# Patient Record
Sex: MALE | Race: WHITE | NOT HISPANIC OR LATINO | Employment: OTHER | URBAN - METROPOLITAN AREA
[De-identification: names, ages, dates, MRNs, and addresses within clinical notes are randomized per-mention and may not be internally consistent; named-entity substitution may affect disease eponyms.]

---

## 2018-06-14 ENCOUNTER — CONVERSION ENCOUNTER (OUTPATIENT)
Dept: OTHER | Facility: HOSPITAL | Age: 78
End: 2018-06-14

## 2018-06-14 ENCOUNTER — OFFICE VISIT CONVERTED (OUTPATIENT)
Dept: OTHER | Facility: HOSPITAL | Age: 78
End: 2018-06-14
Attending: NURSE PRACTITIONER

## 2018-07-16 ENCOUNTER — OFFICE VISIT CONVERTED (OUTPATIENT)
Dept: SURGERY | Facility: CLINIC | Age: 78
End: 2018-07-16
Attending: UROLOGY

## 2018-07-16 ENCOUNTER — CONVERSION ENCOUNTER (OUTPATIENT)
Dept: SURGERY | Facility: CLINIC | Age: 78
End: 2018-07-16

## 2018-08-21 ENCOUNTER — OFFICE VISIT CONVERTED (OUTPATIENT)
Dept: SURGERY | Facility: CLINIC | Age: 78
End: 2018-08-21
Attending: SURGERY

## 2018-10-25 ENCOUNTER — OFFICE VISIT CONVERTED (OUTPATIENT)
Dept: SURGERY | Facility: CLINIC | Age: 78
End: 2018-10-25
Attending: SURGERY

## 2018-10-25 ENCOUNTER — CONVERSION ENCOUNTER (OUTPATIENT)
Dept: SURGERY | Facility: CLINIC | Age: 78
End: 2018-10-25

## 2018-10-31 ENCOUNTER — CONVERSION ENCOUNTER (OUTPATIENT)
Dept: OTHER | Facility: HOSPITAL | Age: 78
End: 2018-10-31

## 2018-10-31 ENCOUNTER — OFFICE VISIT CONVERTED (OUTPATIENT)
Dept: OTHER | Facility: HOSPITAL | Age: 78
End: 2018-10-31
Attending: NURSE PRACTITIONER

## 2018-11-27 ENCOUNTER — OFFICE VISIT CONVERTED (OUTPATIENT)
Dept: SURGERY | Facility: CLINIC | Age: 78
End: 2018-11-27
Attending: UROLOGY

## 2019-02-01 ENCOUNTER — OFFICE VISIT CONVERTED (OUTPATIENT)
Dept: SURGERY | Facility: CLINIC | Age: 79
End: 2019-02-01
Attending: UROLOGY

## 2019-02-01 ENCOUNTER — HOSPITAL ENCOUNTER (OUTPATIENT)
Dept: SURGERY | Facility: CLINIC | Age: 79
Discharge: HOME | End: 2019-02-01

## 2019-02-01 ENCOUNTER — CONVERSION ENCOUNTER (OUTPATIENT)
Dept: SURGERY | Facility: CLINIC | Age: 79
End: 2019-02-01

## 2019-02-03 LAB — BACTERIA UR CULT: NORMAL

## 2019-02-22 ENCOUNTER — PROCEDURE VISIT CONVERTED (OUTPATIENT)
Dept: SURGERY | Facility: CLINIC | Age: 79
End: 2019-02-22
Attending: UROLOGY

## 2020-03-17 ENCOUNTER — OFFICE VISIT CONVERTED (OUTPATIENT)
Dept: UROLOGY | Facility: CLINIC | Age: 80
End: 2020-03-17
Attending: UROLOGY

## 2020-05-01 ENCOUNTER — HOSPITAL ENCOUNTER (OUTPATIENT)
Dept: OTHER | Facility: HOSPITAL | Age: 80
Discharge: HOME | End: 2020-05-01
Attending: UROLOGY

## 2020-05-01 LAB
ANION GAP SERPL CALC-SCNC: 16 MMOL/L (ref 8–19)
BUN SERPL-MCNC: 19 MG/DL (ref 5–25)
BUN/CREAT SERPL: 11 {RATIO} (ref 6–20)
CALCIUM SERPL-MCNC: 8.3 MG/DL (ref 8.7–10.4)
CHLORIDE SERPL-SCNC: 105 MMOL/L (ref 99–111)
CONV CO2: 22 MMOL/L (ref 22–32)
CREAT UR-MCNC: 1.71 MG/DL (ref 0.7–1.2)
GFR SERPLBLD BASED ON 1.73 SQ M-ARVRAT: 38 ML/MIN/{1.73_M2}
GLUCOSE SERPL-MCNC: 141 MG/DL (ref 70–99)
OSMOLALITY SERPL CALC.SUM OF ELEC: 293 MOSM/KG (ref 273–304)
POTASSIUM SERPL-SCNC: 3.7 MMOL/L (ref 3.5–5.3)
SODIUM SERPL-SCNC: 139 MMOL/L (ref 135–147)

## 2020-05-15 ENCOUNTER — HOSPITAL ENCOUNTER (OUTPATIENT)
Dept: CT IMAGING | Facility: HOSPITAL | Age: 80
Discharge: HOME | End: 2020-05-15
Attending: UROLOGY

## 2020-05-26 ENCOUNTER — TELEPHONE CONVERTED (OUTPATIENT)
Dept: UROLOGY | Facility: CLINIC | Age: 80
End: 2020-05-26
Attending: UROLOGY

## 2020-10-26 ENCOUNTER — OFFICE VISIT CONVERTED (OUTPATIENT)
Dept: UROLOGY | Facility: CLINIC | Age: 80
End: 2020-10-26
Attending: NURSE PRACTITIONER

## 2020-11-13 ENCOUNTER — OFFICE VISIT CONVERTED (OUTPATIENT)
Dept: UROLOGY | Facility: CLINIC | Age: 80
End: 2020-11-13
Attending: NURSE PRACTITIONER

## 2021-05-13 ENCOUNTER — OFFICE VISIT CONVERTED (OUTPATIENT)
Dept: UROLOGY | Facility: CLINIC | Age: 81
End: 2021-05-13
Attending: NURSE PRACTITIONER

## 2021-05-13 LAB
BILIRUB UR QL STRIP: NEGATIVE
COLOR UR: YELLOW
CONV BACTERIA IN URINE MICRO: 0
CONV CALCIUM OXALATE CRYSTALS /HPF IN URINE SEDIMENT BY MICROSCOPY: 0
CONV CLARITY OF URINE: CLEAR
CONV PROTEIN IN URINE BY AUTOMATED TEST STRIP: NEGATIVE
CONV UROBILINOGEN IN URINE BY AUTOMATED TEST STRIP: NORMAL
GLUCOSE UR QL: NEGATIVE
HGB UR QL STRIP: NEGATIVE
KETONES UR QL STRIP: NEGATIVE
LEUKOCYTE ESTERASE UR QL STRIP: NEGATIVE
NITRITE UR QL STRIP: NEGATIVE
PH UR STRIP.AUTO: 7 [PH]
RBC #/AREA URNS HPF: 0 /[HPF]
RENAL EPI CELLS #/AREA URNS HPF: 0 /[HPF]
SP GR UR: 1.02
SQUAMOUS SPT QL MICRO: 0
WBC #/AREA URNS HPF: 0 /[HPF]

## 2021-05-13 NOTE — PROGRESS NOTES
Progress Note      Patient Name: Mango Bass   Patient ID: [id number]   Sex: Male   YOB: 1940    Primary Care Provider: AIDEE ASHTON   Referring Provider: Xander Cline MD    Visit Date: May 26, 2020    Provider: Xander Cline MD   Location: [facility]   Location Address: [address] [place] [address]  [id number]   Location Phone: [phone]          Chief Complaint  · pt here for urologic issues      History Of Present Illness     80 yo male with h/o microhematuria.     Never had gross hematuria.  No burning or dysuria    Voiding ok. Nocturia X  6.  No urgency or frequency.  No incontinence.  No prostate meds. Pt is bothered.    2020 creatinine 1.7, GFR 38    PVR     3/20   76    Patient is having a lot of back pain and left hip pain.  Pain does seem worse recently.     5/15/2020 CT abdomen/pelvis withoutatrophic left kidney, no obstructive uropathy.  Marketed prostatomegaly.  AAA 3.5 cm.  Moderate compression fracture at L2.     cystoscopynegative   urine culture and cytology negative    Previous    No history of kidney stone.    No urologic family history,  of colon cancer.   Has never had any urologic surgery.    No cardiopulmonary history.  Patient does not smoke.  Patient does not use blood thinner.      UA 25-50rbc/hpf.     former smoker.       Past Medical History  Chronic headaches; Gait instability; Gout; Hyperlipemia; Hypertension; Liver cirrhosis; Lumbar back pain; MI (myocardial infarction); Neuropathy; Neuropathy; Thrombocytopenia; Weight Loss         Past Surgical History  Inguinal Hernia Repair; Open Heart Surgery         Medication List  atenolol 25 mg oral tablet; diazepam 10 mg oral tablet; gabapentin 400 mg oral capsule; Norco 7.5-325 mg oral tablet; pravastatin 20 mg oral tablet; Uloric 40 mg oral tablet; Voltaren 1 % topical gel         Allergy List  Lipitor       Allergies Reconciled  Family Medical History  Congestive Heart  Failure; Colon Cancer; Aneurysm         Social History  Alcohol (Never); ; Recreational Drug Use; Tobacco (Former)         Immunizations  Name Date Admin   Shingles          Review of Systems  · Constitutional  o Denies  o : chills, fever  · Gastrointestinal  o Denies  o : nausea, vomiting      Physical Examination  · Constitutional  o Appearance  o : Well-appearing, well-developed, in no acute distress  · Neurologic  o Mental Status Examination  o :   § Orientation  § : Grossly oriented to person, place and time, judgment and insight intact, normal mood and affect          Assessment  · Microhematuria     599.72/R31.29    Problems Reconciled  Plan  · Medications  o Medications have been Reconciled  o Transition of Care or Provider Policy  · Instructions  o Electronically Identified Patient Education Materials Provided Electronically       CT could not be done with contrast secondary to patient's renal function.  Discussed with patient only way to work-up his ureters and collecting system would be cystoscopy with bilateral retrograde pyelograms.  Risks and benefits of this were discussed.  After discussion patient understands the risk and does not want to proceed with this.    CT was reviewed with the patient he does have a compression fracture and also has AAA.  I will get him in with his primary care doctor and also vascular surgery to follow-up on these things.    Patient understands he must make this follow-up or could be detrimental to his health    Follow-up with our nurse practitioner in 1 year for urinalysis with micro.    If patient has negative UA with micro for 2 years consecutively he can be discharged from clinic.    Greater than 15 minutes was used in counseling and coordination of care, with greater than 51% of this in face-to-face counseling             Electronically Signed by: Xander Cline MD -Author on May 26, 2020 04:49:03 PM
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
## 2021-05-14 VITALS — HEIGHT: 72 IN | WEIGHT: 185.37 LBS | BODY MASS INDEX: 25.11 KG/M2

## 2021-05-14 VITALS — RESPIRATION RATE: 14 BRPM | WEIGHT: 183.12 LBS | BODY MASS INDEX: 24.27 KG/M2 | HEIGHT: 73 IN

## 2021-05-15 VITALS — WEIGHT: 180 LBS | HEIGHT: 72 IN | RESPIRATION RATE: 17 BRPM | BODY MASS INDEX: 24.38 KG/M2

## 2021-05-16 VITALS — WEIGHT: 217 LBS | BODY MASS INDEX: 29.39 KG/M2 | RESPIRATION RATE: 17 BRPM | HEIGHT: 72 IN

## 2021-05-16 VITALS — WEIGHT: 193 LBS | BODY MASS INDEX: 26.14 KG/M2 | RESPIRATION RATE: 12 BRPM | HEIGHT: 72 IN

## 2021-05-16 VITALS — BODY MASS INDEX: 26.14 KG/M2 | HEIGHT: 72 IN | WEIGHT: 193 LBS | RESPIRATION RATE: 12 BRPM

## 2021-05-16 VITALS — HEIGHT: 72 IN | BODY MASS INDEX: 28.04 KG/M2 | WEIGHT: 207 LBS | RESPIRATION RATE: 16 BRPM

## 2021-05-16 VITALS
HEART RATE: 65 BPM | RESPIRATION RATE: 18 BRPM | TEMPERATURE: 98.2 F | SYSTOLIC BLOOD PRESSURE: 138 MMHG | WEIGHT: 193 LBS | OXYGEN SATURATION: 97 % | DIASTOLIC BLOOD PRESSURE: 62 MMHG | BODY MASS INDEX: 26.14 KG/M2 | HEIGHT: 72 IN

## 2021-05-16 VITALS
WEIGHT: 207 LBS | HEIGHT: 72 IN | SYSTOLIC BLOOD PRESSURE: 148 MMHG | DIASTOLIC BLOOD PRESSURE: 72 MMHG | RESPIRATION RATE: 18 BRPM | OXYGEN SATURATION: 97 % | TEMPERATURE: 97.7 F | BODY MASS INDEX: 28.04 KG/M2 | HEART RATE: 76 BPM

## 2021-05-16 VITALS — WEIGHT: 207 LBS | HEIGHT: 72 IN | RESPIRATION RATE: 18 BRPM | BODY MASS INDEX: 28.04 KG/M2
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
## 2021-07-15 VITALS — RESPIRATION RATE: 16 BRPM | BODY MASS INDEX: 25.21 KG/M2 | HEIGHT: 72 IN | WEIGHT: 186.12 LBS

## 2021-07-28 PROBLEM — R26.81 GAIT INSTABILITY: Status: ACTIVE | Noted: 2017-09-07

## 2021-07-28 PROBLEM — R26.81 GAIT INSTABILITY: Status: RESOLVED | Noted: 2017-09-07 | Resolved: 2021-07-28

## 2021-10-28 ENCOUNTER — OFFICE VISIT (OUTPATIENT)
Dept: UROLOGY | Facility: CLINIC | Age: 81
End: 2021-10-28

## 2021-10-28 VITALS — BODY MASS INDEX: 24.6 KG/M2 | WEIGHT: 181.6 LBS | HEIGHT: 72 IN | RESPIRATION RATE: 14 BRPM

## 2021-10-28 DIAGNOSIS — N40.0 BENIGN PROSTATIC HYPERPLASIA, UNSPECIFIED WHETHER LOWER URINARY TRACT SYMPTOMS PRESENT: ICD-10-CM

## 2021-10-28 DIAGNOSIS — R31.0 GROSS HEMATURIA: ICD-10-CM

## 2021-10-28 DIAGNOSIS — N39.0 URINARY TRACT INFECTION WITHOUT HEMATURIA, SITE UNSPECIFIED: Primary | ICD-10-CM

## 2021-10-28 PROBLEM — R09.89 BILATERAL CAROTID BRUITS: Status: ACTIVE | Noted: 2021-10-28

## 2021-10-28 PROBLEM — I25.10 CORONARY ARTERY DISEASE: Status: ACTIVE | Noted: 2021-10-28

## 2021-10-28 PROBLEM — Z95.1 S/P CABG (CORONARY ARTERY BYPASS GRAFT): Status: ACTIVE | Noted: 2021-10-28

## 2021-10-28 PROBLEM — R63.4 WEIGHT LOSS: Status: ACTIVE | Noted: 2021-10-28

## 2021-10-28 PROBLEM — F03.90 DEMENTIA: Status: ACTIVE | Noted: 2021-10-28

## 2021-10-28 PROBLEM — E78.5 HYPERLIPEMIA: Status: ACTIVE | Noted: 2021-10-28

## 2021-10-28 PROBLEM — D69.6 THROMBOCYTOPENIA (HCC): Status: ACTIVE | Noted: 2017-11-30

## 2021-10-28 PROBLEM — R51.9 CHRONIC HEADACHES: Status: ACTIVE | Noted: 2021-10-28

## 2021-10-28 PROBLEM — M10.9 GOUT: Status: ACTIVE | Noted: 2021-10-28

## 2021-10-28 PROBLEM — G62.9 NEUROPATHY: Status: ACTIVE | Noted: 2017-11-30

## 2021-10-28 PROBLEM — G89.29 CHRONIC HEADACHES: Status: ACTIVE | Noted: 2021-10-28

## 2021-10-28 PROBLEM — I10 HYPERTENSION: Status: ACTIVE | Noted: 2021-10-28

## 2021-10-28 PROBLEM — D61.818 PANCYTOPENIA (HCC): Status: ACTIVE | Noted: 2021-07-08

## 2021-10-28 PROBLEM — R07.9 CHEST PAIN: Status: ACTIVE | Noted: 2021-10-28

## 2021-10-28 PROBLEM — R00.2 PALPITATIONS: Status: ACTIVE | Noted: 2021-10-28

## 2021-10-28 PROBLEM — I21.9 MI (MYOCARDIAL INFARCTION): Status: ACTIVE | Noted: 2021-10-28

## 2021-10-28 PROBLEM — I49.3 PVC (PREMATURE VENTRICULAR CONTRACTION): Status: ACTIVE | Noted: 2021-10-28

## 2021-10-28 PROBLEM — M54.50 LUMBAR BACK PAIN: Status: ACTIVE | Noted: 2021-10-28

## 2021-10-28 PROBLEM — K74.60 LIVER CIRRHOSIS (HCC): Status: ACTIVE | Noted: 2021-10-28

## 2021-10-28 PROBLEM — R26.81 UNSTEADY GAIT WHEN WALKING: Status: ACTIVE | Noted: 2017-09-07

## 2021-10-28 LAB
BILIRUB BLD-MCNC: NEGATIVE MG/DL
CLARITY, POC: CLEAR
COLOR UR: YELLOW
EXPIRATION DATE: NORMAL
GLUCOSE UR STRIP-MCNC: NEGATIVE MG/DL
KETONES UR QL: NEGATIVE
LEUKOCYTE EST, POC: NEGATIVE
Lab: NORMAL
NITRITE UR-MCNC: NEGATIVE MG/ML
PH UR: 7.5 [PH] (ref 5–8)
PROT UR STRIP-MCNC: NEGATIVE MG/DL
RBC # UR STRIP: NEGATIVE /UL
SP GR UR: 1.02 (ref 1–1.03)
URINE VOLUME: 116
UROBILINOGEN UR QL: NORMAL

## 2021-10-28 PROCEDURE — 51798 US URINE CAPACITY MEASURE: CPT | Performed by: NURSE PRACTITIONER

## 2021-10-28 PROCEDURE — 81003 URINALYSIS AUTO W/O SCOPE: CPT | Performed by: NURSE PRACTITIONER

## 2021-10-28 PROCEDURE — 99214 OFFICE O/P EST MOD 30 MIN: CPT | Performed by: NURSE PRACTITIONER

## 2021-10-28 RX ORDER — FOLIC ACID 1 MG/1
1 TABLET ORAL DAILY
COMMUNITY
Start: 2021-10-25 | End: 2022-10-26

## 2021-10-28 RX ORDER — FINASTERIDE 5 MG/1
5 TABLET, FILM COATED ORAL DAILY
Qty: 90 TABLET | Refills: 3 | Status: SHIPPED | OUTPATIENT
Start: 2021-10-28

## 2021-10-28 RX ORDER — GABAPENTIN 400 MG/1
CAPSULE ORAL
COMMUNITY
Start: 2021-10-25

## 2021-10-28 RX ORDER — BUPRENORPHINE 5 UG/H
PATCH TRANSDERMAL
COMMUNITY

## 2021-10-28 RX ORDER — FUROSEMIDE 40 MG/1
TABLET ORAL
COMMUNITY

## 2021-10-28 RX ORDER — TAMSULOSIN HYDROCHLORIDE 0.4 MG/1
1 CAPSULE ORAL DAILY
Qty: 30 CAPSULE | Refills: 11 | Status: SHIPPED | OUTPATIENT
Start: 2021-10-28 | End: 2022-10-23

## 2021-10-28 RX ORDER — FINASTERIDE 5 MG/1
TABLET, FILM COATED ORAL
COMMUNITY
Start: 2021-10-25 | End: 2021-10-28 | Stop reason: SDUPTHER

## 2021-10-28 RX ORDER — GABAPENTIN 300 MG/1
CAPSULE ORAL
COMMUNITY

## 2021-10-28 RX ORDER — SPIRONOLACTONE 25 MG/1
TABLET ORAL
COMMUNITY

## 2021-10-28 RX ORDER — ATENOLOL 25 MG/1
TABLET ORAL
COMMUNITY
Start: 2021-10-18

## 2021-10-28 RX ORDER — RIVASTIGMINE TARTRATE 3 MG/1
CAPSULE ORAL
COMMUNITY
Start: 2021-10-25

## 2021-10-28 RX ORDER — QUETIAPINE FUMARATE 50 MG/1
50 TABLET, FILM COATED ORAL
COMMUNITY

## 2021-10-28 RX ORDER — TRAZODONE HYDROCHLORIDE 50 MG/1
TABLET ORAL
COMMUNITY
Start: 2021-09-23

## 2021-10-28 RX ORDER — AMITRIPTYLINE HYDROCHLORIDE 25 MG/1
TABLET, FILM COATED ORAL
COMMUNITY

## 2021-10-28 RX ORDER — HYDROCODONE BITARTRATE AND ACETAMINOPHEN 10; 325 MG/1; MG/1
TABLET ORAL
COMMUNITY
Start: 2021-10-25

## 2021-10-28 RX ORDER — HYDROCHLOROTHIAZIDE 12.5 MG/1
TABLET ORAL
COMMUNITY
Start: 2021-09-16

## 2021-10-28 RX ORDER — AMOXICILLIN AND CLAVULANATE POTASSIUM 875; 125 MG/1; MG/1
TABLET, FILM COATED ORAL
COMMUNITY

## 2021-10-28 RX ORDER — ERGOCALCIFEROL 1.25 MG/1
CAPSULE ORAL
COMMUNITY
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
## 2021-11-12 ENCOUNTER — HOSPITAL ENCOUNTER (OUTPATIENT)
Dept: CT IMAGING | Facility: HOSPITAL | Age: 81
End: 2021-11-12

## 2021-11-23 ENCOUNTER — HOSPITAL ENCOUNTER (OUTPATIENT)
Dept: CT IMAGING | Facility: HOSPITAL | Age: 81
Discharge: HOME | End: 2021-11-23
Admitting: NURSE PRACTITIONER

## 2021-11-23 DIAGNOSIS — R31.0 GROSS HEMATURIA: ICD-10-CM

## 2021-11-23 LAB — CREAT BLDA-MCNC: 1.8 MG/DL

## 2021-11-23 PROCEDURE — 82565 ASSAY OF CREATININE: CPT

## 2021-11-23 PROCEDURE — 0 IOPAMIDOL PER 1 ML: Performed by: NURSE PRACTITIONER

## 2021-11-23 PROCEDURE — 74178 CT ABD&PLV WO CNTR FLWD CNTR: CPT

## 2021-11-23 RX ADMIN — IOPAMIDOL 80 ML: 755 INJECTION, SOLUTION INTRAVENOUS at 11:17

## 2021-12-17 PROBLEM — N40.1 BENIGN PROSTATIC HYPERPLASIA WITH URINARY FREQUENCY: Status: ACTIVE | Noted: 2021-12-17

## 2021-12-17 PROBLEM — R35.0 BENIGN PROSTATIC HYPERPLASIA WITH URINARY FREQUENCY: Status: ACTIVE | Noted: 2021-12-17
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
## 2022-02-14 ENCOUNTER — OFFICE VISIT (OUTPATIENT)
Dept: UROLOGY | Facility: CLINIC | Age: 82
End: 2022-02-14

## 2022-02-14 VITALS — BODY MASS INDEX: 24.52 KG/M2 | RESPIRATION RATE: 16 BRPM | WEIGHT: 181 LBS | HEIGHT: 72 IN

## 2022-02-14 DIAGNOSIS — R31.0 GROSS HEMATURIA: Primary | ICD-10-CM

## 2022-02-14 DIAGNOSIS — N40.1 BENIGN PROSTATIC HYPERPLASIA WITH URINARY FREQUENCY: ICD-10-CM

## 2022-02-14 DIAGNOSIS — R35.0 BENIGN PROSTATIC HYPERPLASIA WITH URINARY FREQUENCY: ICD-10-CM

## 2022-02-14 PROCEDURE — 52000 CYSTOURETHROSCOPY: CPT | Performed by: UROLOGY

## 2022-02-14 PROCEDURE — 99214 OFFICE O/P EST MOD 30 MIN: CPT | Performed by: UROLOGY

## 2022-02-14 RX ORDER — DOXYCYCLINE 100 MG/1
CAPSULE ORAL
COMMUNITY
Start: 2021-12-31

## 2022-04-13 ENCOUNTER — TELEPHONE (OUTPATIENT)
Dept: UROLOGY | Facility: CLINIC | Age: 82
End: 2022-04-13

## 2022-04-13 NOTE — TELEPHONE ENCOUNTER
Tried to call patients phone to remind him to do a PSA prior to his appt Friday 04/15. His phone went straight to voicemail. Called emergency contact who said he will relay the message to him. Order is already in.

## 2022-04-14 ENCOUNTER — TELEPHONE (OUTPATIENT)
Dept: UROLOGY | Facility: CLINIC | Age: 82
End: 2022-04-14

## 2022-04-14 NOTE — TELEPHONE ENCOUNTER
----- Message from Xander Cline MD sent at 4/14/2022 11:29 AM EDT -----  Regarding: Appointment tomorrow  Did he do his PSA, did he get cardiac clearance for possible TURP

## 2022-04-14 NOTE — TELEPHONE ENCOUNTER
Called and spoke with patient, he plans to go to [place] and have his PSA drawn. We are moving his appt out to 4/22/22 @ 1015 due to not having cardiac clearance back and patient not having SPA completed. He voiced understanding

## 2022-04-20 DIAGNOSIS — N40.1 BENIGN PROSTATIC HYPERPLASIA WITH URINARY FREQUENCY: ICD-10-CM

## 2022-04-20 DIAGNOSIS — R31.0 GROSS HEMATURIA: ICD-10-CM

## 2022-04-20 DIAGNOSIS — R35.0 BENIGN PROSTATIC HYPERPLASIA WITH URINARY FREQUENCY: ICD-10-CM
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
## 2022-04-21 ENCOUNTER — TELEPHONE (OUTPATIENT)
Dept: UROLOGY | Facility: CLINIC | Age: 82
End: 2022-04-21

## 2022-04-21 NOTE — TELEPHONE ENCOUNTER
Spoke to Leanna with patients cardiologists requesting clearance. She said she does not see any clearance letters from us in the chart. I got a different fax number and will send this now.

## 2022-04-22 ENCOUNTER — OFFICE VISIT (OUTPATIENT)
Dept: UROLOGY | Facility: CLINIC | Age: 82
End: 2022-04-22

## 2022-04-22 VITALS — RESPIRATION RATE: 20 BRPM | HEIGHT: 72 IN | BODY MASS INDEX: 24.38 KG/M2 | WEIGHT: 180 LBS

## 2022-04-22 DIAGNOSIS — R35.0 BENIGN PROSTATIC HYPERPLASIA WITH URINARY FREQUENCY: Primary | ICD-10-CM

## 2022-04-22 DIAGNOSIS — R31.0 GROSS HEMATURIA: ICD-10-CM

## 2022-04-22 DIAGNOSIS — N40.1 BENIGN PROSTATIC HYPERPLASIA WITH URINARY FREQUENCY: Primary | ICD-10-CM

## 2022-04-22 PROCEDURE — 51798 US URINE CAPACITY MEASURE: CPT | Performed by: UROLOGY

## 2022-04-22 PROCEDURE — 99213 OFFICE O/P EST LOW 20 MIN: CPT | Performed by: UROLOGY

## 2022-04-22 NOTE — TELEPHONE ENCOUNTER
Spoke to Hannah with cardiology office requesting clearance be sent over. She said they still have not received it, given a different fax number. Will send now.

## 2023-07-24 ENCOUNTER — TRANSCRIBE ORDERS (OUTPATIENT)
Dept: ADMINISTRATIVE | Facility: HOSPITAL | Age: 83
End: 2023-07-24
Payer: MEDICARE

## 2023-07-24 DIAGNOSIS — R13.12 OROPHARYNGEAL DYSPHAGIA: Primary | ICD-10-CM

## 2023-08-30 ENCOUNTER — HOSPITAL ENCOUNTER (OUTPATIENT)
Dept: GENERAL RADIOLOGY | Facility: HOSPITAL | Age: 83
Discharge: HOME | End: 2023-08-30
Admitting: INTERNAL MEDICINE
Payer: MEDICARE

## 2023-08-30 DIAGNOSIS — R13.12 OROPHARYNGEAL DYSPHAGIA: ICD-10-CM

## 2023-08-30 PROCEDURE — A9270 NON-COVERED ITEM OR SERVICE: HCPCS | Performed by: INTERNAL MEDICINE

## 2023-08-30 PROCEDURE — 63710000001 BARIUM SULFATE 40 % RECONSTITUTED SUSPENSION: Performed by: INTERNAL MEDICINE

## 2023-08-30 PROCEDURE — 74230 X-RAY XM SWLNG FUNCJ C+: CPT

## 2023-08-30 PROCEDURE — 92611 MOTION FLUOROSCOPY/SWALLOW: CPT

## 2023-08-30 PROCEDURE — 63710000001 BARIUM SULFATE 60 % CREAM: Performed by: INTERNAL MEDICINE

## 2023-08-30 RX ADMIN — BARIUM SULFATE 1 TEASPOON(S): 0.6 CREAM ORAL at 11:55

## 2023-08-30 RX ADMIN — BARIUM SULFATE 55 ML: 0.81 POWDER, FOR SUSPENSION ORAL at 11:55

## 2023-08-30 NOTE — MBS/VFSS/FEES
Outpatient  - Speech Language Pathology   Swallow  Modified Barium Swallow Study  KAYLEIGH Quintanilla     Patient Name: Mango Bass  : 1940  MRN: [id number]  Today's Date: 2023               Admit Date: 2023    Visit Dx:     ICD-10-CM ICD-9-CM   1. Oropharyngeal dysphagia  R13.12 787.22     Patient Active Problem List   Diagnosis    Bilateral carotid bruits    Chest pain    Chronic headaches    Coronary artery disease    Dementia    Gout    Hyperlipemia    Hypertension    Liver cirrhosis    Lumbar back pain    MI (myocardial infarction)    Neuropathy    Palpitations    Pancytopenia    PVC (premature ventricular contraction)    S/P CABG (coronary artery bypass graft)    Thrombocytopenia    Weight loss    Unsteady gait when walking    Gross hematuria    Benign prostatic hyperplasia with urinary frequency     Past Medical History:   Diagnosis Date    Chronic headache     Gait instability 2017    History somewhat limited due to pt hard of hearing and difficulty providing detailed hx. He and friend report abrupt onset of gait instability,  1 year ago that has progressively worsened, On exam, he does have evidence to suggest an underlying neuropathy. Today, I will pursue labs and and MRI of the brain. I will request his record, including prior NCS be sent for my review.     Gout     Hyperlipemia     Hypertension     Liver cirrhosis     Lumbar back pain     MI (mitral incompetence)     Neuropathy 2017    Today, I will persue an EMG/NCS. I will pursue an MRI of the L spine    Thrombocytopenia 2018    Patient is noted to have a platelet count of 97,000. I question if this could be to the chronic alcohol abuse but he notes that he has stopped drinking.  Given his 10 unintentional weight loss, I did refer the patient for heme/onc evaluation  to exclude a more oninous etiology. Initial heme/onc reconds felt low platelt count likely related to medicatons. He is to follow up with them today    Weight  loss      Past Surgical History:   Procedure Laterality Date    CARDIAC SURGERY      open heart surgery    INGUINAL HERNIA REPAIR Right      MODIFIED BARIUM SWALLOW STUDY: SPEECH PATHOLOGY REPORT        DATE OF SERVICE: 8/30/2023    PERTINENT INFORMATION:  Mr. Bass is a 83year old male with diagnosis of dysphagia.  Patient denying difficulty, initially disagreeing to test though did agree to take a few bites/sips..    He was referred for an MBSS by Dr. Stewart to rule out aspiration as well as to determine appropriate treatment plan for this patient.      PROCEDURE:    Mr. Bass was alert and cooperative.  The patient was viewed in a lateral plane.  The following Ba consistencies were administered: Thin liquids,  barium mixed with applesauce, barium mixed with cracker. The following compensatory swallowing strategies were performed: Bolus modification, cyclic ingestion.    RESULTS:    1.  Thin liquid by straw with spillage to the vallecula, swallow completed with moderate residue remaining at the vallecula.  2.  Pur‚e with rocking the bolus, swallow completed.  Residue remaining at the vallecula.  3.  Solid with patient demonstrating adequate chewing, swallow completed out of view, residue at the vallecula.  Patient was unsuccessful to complete second swallow with cueing.  Residues did appear to clear with liquid wash.  Patient refusing further trials of barium, sequential swallows of clear water was utilized as liquid wash.      IMPRESSIONS:    Mr. Bass demonstrated oropharyngeal dysphagia characterized by swallow delay.  No laryngeal penetration, no aspiration were observed during the study.  Note risk of aspiration cannot be completely ruled out during this limited study.     FUNCTIONAL DEFICIT: Patient scored level 5 of 7 on Functional Communication Measures for swallowing indicating a 20-39% limitation in function for current status, goal status, and discharge status.      RECOMMENDATIONS:   1.  Diet of  regular soft solids cut small, thin liquid.  2.  Positioning fully upright for all p.o. intake and 30 minutes following.  3.  Alternate small bites and small sips of solids and liquids at a slow rate.  Cue for double swallow each bite/sip.  May utilize controlled straw drink with single sips.      Yes, patient/responsible party agrees with the plan of care and has been informed of all alternatives, risks and benefits.    Thank you for this referral.                                                                                                            Time Calculation:    Time Calculation- SLP       Row Name 08/30/23 1442             Time Calculation- SLP    SLP Received On 08/30/23  -TB         Untimed Charges    SLP Eval/Re-eval  [facility] Fluoro Eval Swallow - 90825  -TB      89896-[facility] Fluoro Eval Swallow Minutes 90  -TB         Total Minutes    Untimed Charges Total Minutes 90  -TB       Total Minutes 90  -TB                User Key  (r) = Recorded By, (t) = Taken By, (c) = Cosigned By      Initials Name Provider Type    TB Sarah Maria SLP Speech and Language Pathologist                    Therapy Charges for Today       Code Description Service Date Service Provider Modifiers Qty    [id number]  [facility] FLUORO EVAL SWALLOW 6 8/30/2023 Sarah Maria SLP GN 1                 ROSALINE Valerio  8/30/2023

## 2023-10-10 ENCOUNTER — HOSPITAL ENCOUNTER (EMERGENCY)
Facility: HOSPITAL | Age: 83
Discharge: NURSING, REHAB OR LONG-TERM CARE | End: 2023-10-10
Attending: EMERGENCY MEDICINE | Admitting: EMERGENCY MEDICINE
Payer: MEDICARE

## 2023-10-10 ENCOUNTER — APPOINTMENT (OUTPATIENT)
Dept: GENERAL RADIOLOGY | Facility: HOSPITAL | Age: 83
End: 2023-10-10
Payer: MEDICARE

## 2023-10-10 VITALS
SYSTOLIC BLOOD PRESSURE: 151 MMHG | RESPIRATION RATE: 12 BRPM | DIASTOLIC BLOOD PRESSURE: 61 MMHG | TEMPERATURE: 97.9 F | HEIGHT: 72 IN | BODY MASS INDEX: 20.93 KG/M2 | WEIGHT: 154.54 LBS | OXYGEN SATURATION: 96 % | HEART RATE: 76 BPM

## 2023-10-10 DIAGNOSIS — U07.1 COVID-19: Primary | ICD-10-CM

## 2023-10-10 LAB
ALBUMIN SERPL-MCNC: 2.8 G/DL (ref 3.5–5.2)
ALBUMIN/GLOB SERPL: 0.7 G/DL
ALP SERPL-CCNC: 70 U/L (ref 39–117)
ALT SERPL W P-5'-P-CCNC: 11 U/L (ref 1–41)
ANION GAP SERPL CALCULATED.3IONS-SCNC: 10.9 MMOL/L (ref 5–15)
ARTERIAL PATENCY WRIST A: ABNORMAL
AST SERPL-CCNC: 24 U/L (ref 1–40)
BASE EXCESS BLDA CALC-SCNC: -2.2 MMOL/L (ref -2–2)
BASOPHILS # BLD AUTO: 0.02 10*3/MM3 (ref 0–0.2)
BASOPHILS NFR BLD AUTO: 0.4 % (ref 0–1.5)
BDY SITE: ABNORMAL
BILIRUB SERPL-MCNC: 1.3 MG/DL (ref 0–1.2)
BUN SERPL-MCNC: 21 MG/DL (ref 8–23)
BUN/CREAT SERPL: 12.5 (ref 7–25)
CALCIUM SPEC-SCNC: 8.8 MG/DL (ref 8.6–10.5)
CHLORIDE SERPL-SCNC: 110 MMOL/L (ref 98–107)
CO2 SERPL-SCNC: 23.1 MMOL/L (ref 22–29)
COHGB MFR BLD: 0.1 % (ref 0–1.5)
CREAT SERPL-MCNC: 1.68 MG/DL (ref 0.76–1.27)
DEPRECATED RDW RBC AUTO: 58.4 FL (ref 37–54)
EGFRCR SERPLBLD CKD-EPI 2021: 40.6 ML/MIN/1.73
EOSINOPHIL # BLD AUTO: 0.03 10*3/MM3 (ref 0–0.4)
EOSINOPHIL NFR BLD AUTO: 0.5 % (ref 0.3–6.2)
ERYTHROCYTE [DISTWIDTH] IN BLOOD BY AUTOMATED COUNT: 15.6 % (ref 12.3–15.4)
FHHB: 10.3 % (ref 0–5)
FLUAV SUBTYP SPEC NAA+PROBE: NOT DETECTED
FLUBV RNA ISLT QL NAA+PROBE: NOT DETECTED
GAS FLOW AIRWAY: 0 LPM
GLOBULIN UR ELPH-MCNC: 3.8 GM/DL
GLUCOSE SERPL-MCNC: 88 MG/DL (ref 65–99)
HCO3 BLDA-SCNC: 20.2 MMOL/L (ref 22–26)
HCT VFR BLD AUTO: 34.3 % (ref 37.5–51)
HGB BLD-MCNC: 11.3 G/DL (ref 13–17.7)
HGB BLDA-MCNC: 12.2 G/DL (ref 13.8–16.4)
HOLD SPECIMEN: NORMAL
HOLD SPECIMEN: NORMAL
IMM GRANULOCYTES # BLD AUTO: 0.01 10*3/MM3 (ref 0–0.05)
IMM GRANULOCYTES NFR BLD AUTO: 0.2 % (ref 0–0.5)
INHALED O2 CONCENTRATION: 21 %
LIPASE SERPL-CCNC: 18 U/L (ref 13–60)
LYMPHOCYTES # BLD AUTO: 1.07 10*3/MM3 (ref 0.7–3.1)
LYMPHOCYTES NFR BLD AUTO: 19.2 % (ref 19.6–45.3)
MAGNESIUM SERPL-MCNC: 2.1 MG/DL (ref 1.6–2.4)
MCH RBC QN AUTO: 34.2 PG (ref 26.6–33)
MCHC RBC AUTO-ENTMCNC: 32.9 G/DL (ref 31.5–35.7)
MCV RBC AUTO: 103.9 FL (ref 79–97)
METHGB BLD QL: 0.2 % (ref 0–1.5)
MODALITY: ABNORMAL
MONOCYTES # BLD AUTO: 0.52 10*3/MM3 (ref 0.1–0.9)
MONOCYTES NFR BLD AUTO: 9.3 % (ref 5–12)
NEUTROPHILS NFR BLD AUTO: 3.93 10*3/MM3 (ref 1.7–7)
NEUTROPHILS NFR BLD AUTO: 70.4 % (ref 42.7–76)
NRBC BLD AUTO-RTO: 0 /100 WBC (ref 0–0.2)
NT-PROBNP SERPL-MCNC: 1481 PG/ML (ref 0–1800)
OXYHGB MFR BLDV: 89.4 % (ref 94–99)
PCO2 BLDA: 28 MM HG (ref 35–45)
PH BLDA: 7.48 PH UNITS (ref 7.35–7.45)
PLATELET # BLD AUTO: 66 10*3/MM3 (ref 140–450)
PMV BLD AUTO: 9.8 FL (ref 6–12)
PO2 BLD: 264 MM[HG] (ref 0–500)
PO2 BLDA: 55.4 MM HG (ref 80–100)
POTASSIUM SERPL-SCNC: 3.9 MMOL/L (ref 3.5–5.2)
PROT SERPL-MCNC: 6.6 G/DL (ref 6–8.5)
QT INTERVAL: 428 MS
QTC INTERVAL: 470 MS
RBC # BLD AUTO: 3.3 10*6/MM3 (ref 4.14–5.8)
RSV RNA NPH QL NAA+NON-PROBE: NOT DETECTED
S PYO AG THROAT QL: NEGATIVE
SAO2 % BLDCOA: 89.7 % (ref 95–99)
SARS-COV-2 RNA RESP QL NAA+PROBE: DETECTED
SODIUM SERPL-SCNC: 144 MMOL/L (ref 136–145)
TROPONIN T SERPL HS-MCNC: 35 NG/L
WBC NRBC COR # BLD: 5.58 10*3/MM3 (ref 3.4–10.8)
WHOLE BLOOD HOLD COAG: NORMAL
WHOLE BLOOD HOLD SPECIMEN: NORMAL

## 2023-10-10 PROCEDURE — 87637 SARSCOV2&INF A&B&RSV AMP PRB: CPT | Performed by: EMERGENCY MEDICINE

## 2023-10-10 PROCEDURE — 87880 STREP A ASSAY W/OPTIC: CPT | Performed by: EMERGENCY MEDICINE

## 2023-10-10 PROCEDURE — 71045 X-RAY EXAM CHEST 1 VIEW: CPT

## 2023-10-10 PROCEDURE — 82375 ASSAY CARBOXYHB QUANT: CPT | Performed by: EMERGENCY MEDICINE

## 2023-10-10 PROCEDURE — 83690 ASSAY OF LIPASE: CPT | Performed by: EMERGENCY MEDICINE

## 2023-10-10 PROCEDURE — 87081 CULTURE SCREEN ONLY: CPT | Performed by: EMERGENCY MEDICINE

## 2023-10-10 PROCEDURE — 83880 ASSAY OF NATRIURETIC PEPTIDE: CPT | Performed by: EMERGENCY MEDICINE

## 2023-10-10 PROCEDURE — 94761 N-INVAS EAR/PLS OXIMETRY MLT: CPT

## 2023-10-10 PROCEDURE — 85025 COMPLETE CBC W/AUTO DIFF WBC: CPT | Performed by: EMERGENCY MEDICINE

## 2023-10-10 PROCEDURE — 25010000002 METHYLPREDNISOLONE PER 125 MG: Performed by: EMERGENCY MEDICINE

## 2023-10-10 PROCEDURE — 99284 EMERGENCY DEPT VISIT MOD MDM: CPT

## 2023-10-10 PROCEDURE — 83735 ASSAY OF MAGNESIUM: CPT | Performed by: EMERGENCY MEDICINE

## 2023-10-10 PROCEDURE — 84484 ASSAY OF TROPONIN QUANT: CPT | Performed by: EMERGENCY MEDICINE

## 2023-10-10 PROCEDURE — 83050 HGB METHEMOGLOBIN QUAN: CPT | Performed by: EMERGENCY MEDICINE

## 2023-10-10 PROCEDURE — 94799 UNLISTED PULMONARY SVC/PX: CPT

## 2023-10-10 PROCEDURE — 93005 ELECTROCARDIOGRAM TRACING: CPT

## 2023-10-10 PROCEDURE — 36600 WITHDRAWAL OF ARTERIAL BLOOD: CPT | Performed by: EMERGENCY MEDICINE

## 2023-10-10 PROCEDURE — 36415 COLL VENOUS BLD VENIPUNCTURE: CPT

## 2023-10-10 PROCEDURE — 96374 THER/PROPH/DIAG INJ IV PUSH: CPT

## 2023-10-10 PROCEDURE — 82805 BLOOD GASES W/O2 SATURATION: CPT | Performed by: EMERGENCY MEDICINE

## 2023-10-10 PROCEDURE — 94640 AIRWAY INHALATION TREATMENT: CPT

## 2023-10-10 PROCEDURE — 93005 ELECTROCARDIOGRAM TRACING: CPT | Performed by: EMERGENCY MEDICINE

## 2023-10-10 PROCEDURE — 80053 COMPREHEN METABOLIC PANEL: CPT | Performed by: EMERGENCY MEDICINE

## 2023-10-10 RX ORDER — ASPIRIN 81 MG/1
324 TABLET, CHEWABLE ORAL ONCE
Status: DISCONTINUED | OUTPATIENT
Start: 2023-10-10 | End: 2023-10-10 | Stop reason: HOSPADM

## 2023-10-10 RX ORDER — IPRATROPIUM BROMIDE AND ALBUTEROL SULFATE 2.5; .5 MG/3ML; MG/3ML
6 SOLUTION RESPIRATORY (INHALATION) ONCE
Status: COMPLETED | OUTPATIENT
Start: 2023-10-10 | End: 2023-10-10

## 2023-10-10 RX ORDER — SODIUM CHLORIDE 0.9 % (FLUSH) 0.9 %
10 SYRINGE (ML) INJECTION AS NEEDED
Status: DISCONTINUED | OUTPATIENT
Start: 2023-10-10 | End: 2023-10-10 | Stop reason: HOSPADM

## 2023-10-10 RX ORDER — METHYLPREDNISOLONE SODIUM SUCCINATE 125 MG/2ML
125 INJECTION, POWDER, LYOPHILIZED, FOR SOLUTION INTRAMUSCULAR; INTRAVENOUS ONCE
Status: COMPLETED | OUTPATIENT
Start: 2023-10-10 | End: 2023-10-10

## 2023-10-10 RX ADMIN — IPRATROPIUM BROMIDE AND ALBUTEROL SULFATE 6 ML: .5; 3 SOLUTION RESPIRATORY (INHALATION) at 07:11

## 2023-10-10 RX ADMIN — METHYLPREDNISOLONE SODIUM SUCCINATE 125 MG: 125 INJECTION INTRAMUSCULAR; INTRAVENOUS at 07:36

## 2023-10-10 NOTE — ED NOTES
Attempted to call report to [facility] prior to pt return. Placed on hold for over 10 minutes. Discharge packet with instructions sent back to facility with pt.

## 2023-10-10 NOTE — ED PROVIDER NOTES
Time: 9:22 AM EDT  Date of encounter:  10/10/2023  Independent Historian/Clinical History and Information was obtained by:   Patient and EMS  Chief Complaint: Shortness of breath    History is limited by: Dementia    History of Present Illness:  Patient is a 83 y.o. year old male who presents to the emergency department for evaluation of shortness of breath/chest pain.  Patient presents to the ER from nursing facility.  Patient has a history of dementia.  History is limited due to his dementia.  It is reported initially the patient was here with chest pain and congestion.  Patient is unable to provide significant history to validate these complaints.  Patient has obvious chest congestion with cough.  Patient has a history of COPD.  Upon reviewing the notes from med she patient is not on home oxygen.    HPI    Patient Care Team  Primary Care Provider: Corazon Jain APRN    Past Medical History:     Allergies   Allergen Reactions    Atorvastatin Unknown - Low Severity     Past Medical History:   Diagnosis Date    Chronic headache     Gait instability 09/07/2017    History somewhat limited due to pt hard of hearing and difficulty providing detailed hx. He and friend report abrupt onset of gait instability,  1 year ago that has progressively worsened, On exam, he does have evidence to suggest an underlying neuropathy. Today, I will pursue labs and and MRI of the brain. I will request his record, including prior NCS be sent for my review.     Gout     Hyperlipemia     Hypertension     Liver cirrhosis     Lumbar back pain     MI (mitral incompetence)     Neuropathy 11/30/2017    Today, I will persue an EMG/NCS. I will pursue an MRI of the L spine    Thrombocytopenia 11/30/2018    Patient is noted to have a platelet count of 97,000. I question if this could be to the chronic alcohol abuse but he notes that he has stopped drinking.  Given his 10 unintentional weight loss, I did refer the patient for heme/onc  evaluation  to exclude a more oninous etiology. Initial heme/onc reconds felt low platelt count likely related to medicatons. He is to follow up with them today    Weight loss      Past Surgical History:   Procedure Laterality Date    CARDIAC SURGERY      open heart surgery    INGUINAL HERNIA REPAIR Right      Family History   Problem Relation Age of Onset    Heart failure Mother     Colon cancer Father     Aneurysm Other        Home Medications:  Prior to Admission medications    Medication Sig Start Date End Date Taking? Authorizing Provider   amitriptyline (ELAVIL) 25 MG tablet amitriptyline 25 mg oral tablet take 1 tablet (25 mg) by oral route once daily at bedtime   Suspended    Melissa Martin MD   amoxicillin-clavulanate (Augmentin) 875-125 MG per tablet Augmentin 875-125 mg oral tablet take 1 tablet by oral route every 12 hours   Suspended    Melissa Martin MD   atenolol (TENORMIN) 25 MG tablet  10/18/21   Melissa Martin MD   Buprenorphine (BUTRANS) 5 MCG/HR patch weekly transdermal patch     Melissa Martin MD   doxycycline (MONODOX) 100 MG capsule  12/31/21   Melissa Martin MD   ergocalciferol (ERGOCALCIFEROL) 1.25 MG (59582 UT) capsule Vitamin D2 50,000 unit oral capsule take 1 capsule (50,000 unit) by oral route once weekly   Suspended    Melissa Martin MD   finasteride (PROSCAR) 5 MG tablet Take 1 tablet by mouth Daily. 10/28/21   Valeri Monaco APRN   furosemide (Lasix) 40 MG tablet Lasix 40 mg oral tablet take 1 tablet (40 mg) by oral route once daily   Suspended    Melissa Martin MD   gabapentin (NEURONTIN) 300 MG capsule     Melissa Martin MD   gabapentin (NEURONTIN) 400 MG capsule  10/25/21   Melissa Martin MD   hydroCHLOROthiazide (HYDRODIURIL) 12.5 MG tablet  9/16/21   Melissa Martin MD   HYDROcodone-acetaminophen (NORCO)  MG per tablet  10/25/21   Melissa Martin MD   QUEtiapine (SEROquel) 50 MG tablet Take 50 mg  "by mouth.    Melissa Martin MD   rivastigmine (EXELON) 3 MG capsule  10/25/21   Melissa Martin MD   spironolactone (ALDACTONE) 25 MG tablet spironolactone 25 mg oral tablet take 1 tablet (25 mg) by oral route once daily   Suspended    Melissa Martin MD   TRAMADOL HCL PO     Melissa Martin MD   traZODone (DESYREL) 50 MG tablet  9/23/21   Melissa Martin MD        Social History:   Social History     Tobacco Use    Smoking status: Former     Types: Cigars    Smokeless tobacco: Never    Tobacco comments:     Quit cigars 2004   Vaping Use    Vaping Use: Never used   Substance Use Topics    Alcohol use: Never    Drug use: Never         Review of Systems:  Review of Systems   Unable to perform ROS: Dementia   HENT:  Positive for congestion.    Respiratory:  Positive for cough.    Skin:  Negative for pallor.   All other systems reviewed and are negative.       Physical Exam:  /60   Pulse 79   Temp 97.9 øF (36.6 øC) (Oral)   Resp 12   Ht 182.9 cm (72\")   Wt 70.1 kg (154 lb 8.7 oz)   SpO2 95%   BMI 20.96 kg/mý     Physical Exam      Vital signs were reviewed under triage note.  General appearance - Patient appears well-developed and well-nourished.  Patient is in no acute distress.  Head - Normocephalic, atraumatic.  Pupils - Equal, round, reactive to light.  Extraocular muscles are intact.  Conjunctiva is clear.  Nasal - Normal inspection.  No evidence of trauma or epistaxis.  Tympanic membranes - Gray, intact without erythema or retractions.  Oral mucosa - Pink and moist without lesions or erythema.  Uvula is midline.  Chest wall - Atraumatic.  Chest wall is nontender.  There are no vesicular rashes noted.  Neck - Supple.  Trachea was midline.  There is no palpable lymphadenopathy or thyromegaly.  There are no meningeal signs  Lungs - Auscultation reveals coarse breath sounds throughout.  After deep suctioning the lung sounds were greatly improved.  There is no wheezes or " rhonchi noted.  Heart - Regular rate and rhythm without any murmurs, clicks, or gallops.  Abdomen - Soft.  Bowel sounds are present.  There is no palpable tenderness.  There is no rebound, guarding, or rigidity.  There are no palpable masses.  There are no pulsatile masses.  Back - Spine is straight and midline.  There is no CVA tenderness.  Extremities - Intact x4 with full range of motion.  There is no palpable edema.  Pulses are intact x4 and equal.  Neurologic - Patient is awake an alert.  Patient is pleasantly confused.  Cranial nerves II through XII are grossly intact.  Motor and sensory functions grossly intact.    Integument - There are no rashes.  There are no petechia or purpura lesions noted.  There are no vesicular lesions noted.      Procedures:  Procedures      Medical Decision Making:      Comorbidities that affect care:    Gout, hyperlipidemia, hypertension, liver cirrhosis, neuropathy, dementia    External Notes reviewed:    Nursing Home Note: Information packet from the nursing was reviewed by me which include the patient's past medical history.  No significant acute information was provided regarding the patient's acute illness.      The following orders were placed and all results were independently analyzed by me:  Orders Placed This Encounter   Procedures    Rapid Strep A Screen - Swab, Throat    COVID PRE-OP / PRE-PROCEDURE SCREENING ORDER (NO ISOLATION) - Swab, Nasopharynx    COVID-19, FLU A/B, RSV PCR - Swab, Nasopharynx    Beta Strep Culture, Throat - Swab, Throat    XR Chest 1 View    [place] Draw    High Sensitivity Troponin T    Comprehensive Metabolic Panel    Lipase    BNP    Magnesium    CBC Auto Differential    Blood Gas, Arterial -With Co-Ox Panel: Yes    High Sensitivity Troponin T 2Hr    NPO Diet NPO Type: Strict NPO    Undress & Gown    Continuous Pulse Oximetry    Oxygen Therapy- Nasal Cannula; Titrate 1-6 LPM Per SpO2; 90 - 95%    Nasotracheal Suctioning (RT)    ECG 12 Lead ED  Triage Standing Order; Chest Pain    ECG 12 Lead ED Triage Standing Order; Chest Pain    Insert Peripheral IV    CBC & Differential    Green Top (Gel)    Lavender Top    Gold Top - SST    Light Blue Top       Medications Given in the Emergency Department:  Medications   sodium chloride 0.9 % flush 10 mL (has no administration in time range)   aspirin chewable tablet 324 mg (324 mg Oral Not Given 10/10/23 0732)   methylPREDNISolone sodium succinate (SOLU-Medrol) injection 125 mg (125 mg Intravenous Given 10/10/23 0736)   ipratropium-albuterol (DUO-NEB) nebulizer solution 6 mL (6 mL Nebulization Given 10/10/23 0711)        ED Course:    ED Course as of 10/10/23 0929   Tue Oct 10, 2023   0925 EKG performed at 542 was interpreted by me to show a normal sinus rhythm with a ventricular rate of 72 bpm.  The AL interval is prolonged at 250 ms which represents a first-degree block.  P waves are normal.  QRS interval is normal.  Axis is leftward at -22 degrees.  There is no acute ischemic ST or T wave change identified.  QT corrected was 470 ms. [TB]      ED Course User Index  [TB] Lewis Cunningham DO     The patient was seen and evaluated in the ED by me.  The above history and physical examination was performed as documented.  Diagnostic data was obtained.  Results reviewed.  Discussed with the patient however not sure if he truly understands.  Patient is not hypoxic but if he becomes hypoxic to be placed on oxygen at the nursing facility.  There is no pneumonic consolidation or any other signs that would warrant acute hospitalization    Labs:    Lab Results (last 24 hours)       Procedure Component Value Units Date/Time    Rapid Strep A Screen - Swab, Throat [[id number]]  (Normal) Collected: 10/10/23 0640    Specimen: Swab from Throat Updated: 10/10/23 0711     Strep A Ag Negative    COVID PRE-OP / PRE-PROCEDURE SCREENING ORDER (NO ISOLATION) - Swab, Nasopharynx [[id number]]  (Abnormal) Collected: 10/10/23 0640    Specimen: Swab  from Nasopharynx Updated: 10/10/23 0744    Narrative:      The following orders were created for panel order COVID PRE-OP / PRE-PROCEDURE SCREENING ORDER (NO ISOLATION) - Swab, Nasopharynx.  Procedure                               Abnormality         Status                     ---------                               -----------         ------                     COVID-19, FLU A/B, RSV P...[[id number]]  Abnormal            Final result                 Please view results for these tests on the individual orders.    COVID-19, FLU A/B, RSV PCR - Swab, Nasopharynx [[id number]]  (Abnormal) Collected: 10/10/23 0640    Specimen: Swab from Nasopharynx Updated: 10/10/23 0744     COVID19 Detected     Influenza A PCR Not Detected     Influenza B PCR Not Detected     RSV, PCR Not Detected    Narrative:      Fact sheet for providers: https://www.fda.gov/media/619011/download    Fact sheet for patients: https://www.fda.gov/media/013943/download    Test performed by PCR.    Beta Strep Culture, Throat - Swab, Throat [[id number]] Collected: 10/10/23 0640    Specimen: Swab from Throat Updated: 10/10/23 0711    Blood Gas, Arterial -With Co-Ox Panel: Yes [[id number]]  (Abnormal) Collected: 10/10/23 0729    Specimen: Arterial Blood from Arm, Right Updated: 10/10/23 0730     pH, Arterial 7.476 pH units      pCO2, Arterial 28.0 mm Hg      pO2, Arterial 55.4 mm Hg      HCO3, Arterial 20.2 mmol/L      Base Excess, Arterial -2.2 mmol/L      O2 Saturation, Arterial 89.7 %      Hemoglobin, Blood Gas 12.2 g/dL      Carboxyhemoglobin 0.1 %      Methemoglobin 0.20 %      Oxyhemoglobin 89.4 %      FHHB 10.3 %      Site Arterial: right brachial     Modality Room Air     FIO2 21 %      Flow Rate 0 lpm      PO2/FIO2 264     Andrew's Test N/A    High Sensitivity Troponin T [[id number]]  (Abnormal) Collected: 10/10/23 0742    Specimen: Blood Updated: 10/10/23 0811     HS Troponin T 35 ng/L     Narrative:      High Sensitive Troponin T Reference Range:  <10.0  ng/L- Negative Female for AMI  <15.0 ng/L- Negative Male for AMI  >=10 - Abnormal Female indicating possible myocardial injury.  >=15 - Abnormal Male indicating possible myocardial injury.   Clinicians would have to utilize clinical acumen, EKG, Troponin, and serial changes to determine if it is an Acute Myocardial Infarction or myocardial injury due to an underlying chronic condition.         CBC & Differential [[id number]]  (Abnormal) Collected: 10/10/23 0742    Specimen: Blood Updated: 10/10/23 0756    Narrative:      The following orders were created for panel order CBC & Differential.  Procedure                               Abnormality         Status                     ---------                               -----------         ------                     CBC Auto Differential[[id number]]        Abnormal            Final result                 Please view results for these tests on the individual orders.    Comprehensive Metabolic Panel [[id number]]  (Abnormal) Collected: 10/10/23 0742    Specimen: Blood Updated: 10/10/23 0811     Glucose 88 mg/dL      BUN 21 mg/dL      Creatinine 1.68 mg/dL      Sodium 144 mmol/L      Potassium 3.9 mmol/L      Chloride 110 mmol/L      CO2 23.1 mmol/L      Calcium 8.8 mg/dL      Total Protein 6.6 g/dL      Albumin 2.8 g/dL      ALT (SGPT) 11 U/L      AST (SGOT) 24 U/L      Alkaline Phosphatase 70 U/L      Total Bilirubin 1.3 mg/dL      Globulin 3.8 gm/dL      A/G Ratio 0.7 g/dL      BUN/Creatinine Ratio 12.5     Anion Gap 10.9 mmol/L      eGFR 40.6 mL/min/1.73     Narrative:      GFR Normal >60  Chronic Kidney Disease <60  Kidney Failure <15    The GFR formula is only valid for adults with stable renal function between ages 18 and 70.    Lipase [[id number]]  (Normal) Collected: 10/10/23 0742    Specimen: Blood Updated: 10/10/23 0811     Lipase 18 U/L     BNP [[id number]]  (Normal) Collected: 10/10/23 0742    Specimen: Blood Updated: 10/10/23 0808     proBNP 1,481.0 pg/mL      Narrative:      This assay is used as an aid in the diagnosis of individuals suspected of having heart failure. It can be used as an aid in the diagnosis of acute decompensated heart failure (ADHF) in patients presenting with signs and symptoms of ADHF to the emergency department (ED). In addition, NT-proBNP of <300 pg/mL indicates ADHF is not likely.    Age Range Result Interpretation  NT-proBNP Concentration (pg/mL:      <50             Positive            >450                   Gray                 300-450                    Negative             <300    50-75           Positive            >900                  Gray                300-900                  Negative            <300      >75             Positive            >1800                  Gray                300-1800                  Negative            <300    Magnesium [[id number]]  (Normal) Collected: 10/10/23 0742    Specimen: Blood Updated: 10/10/23 0811     Magnesium 2.1 mg/dL     CBC Auto Differential [[id number]]  (Abnormal) Collected: 10/10/23 0742    Specimen: Blood Updated: 10/10/23 0756     WBC 5.58 10*3/mm3      RBC 3.30 10*6/mm3      Hemoglobin 11.3 g/dL      Hematocrit 34.3 %      .9 fL      MCH 34.2 pg      MCHC 32.9 g/dL      RDW 15.6 %      RDW-SD 58.4 fl      MPV 9.8 fL      Platelets 66 10*3/mm3      Neutrophil % 70.4 %      Lymphocyte % 19.2 %      Monocyte % 9.3 %      Eosinophil % 0.5 %      Basophil % 0.4 %      Immature Grans % 0.2 %      Neutrophils, Absolute 3.93 10*3/mm3      Lymphocytes, Absolute 1.07 10*3/mm3      Monocytes, Absolute 0.52 10*3/mm3      Eosinophils, Absolute 0.03 10*3/mm3      Basophils, Absolute 0.02 10*3/mm3      Immature Grans, Absolute 0.01 10*3/mm3      nRBC 0.0 /100 WBC              Imaging:    XR Chest 1 View    Result Date: 10/10/2023  PROCEDURE: XR CHEST 1 VW  COMPARISON: [facility], CT, CT CHEST ABD PEL W CONTRAST, 10/19/2020, 20:27.  [facility], CR, CHEST AP/PA 1 VIEW,  10/27/2003, 14:36.  [facility], CR, CHEST AP/PA 1 VIEW, 10/19/2020, 20:00.  INDICATIONS: Chest Pain  FINDINGS:  Postsurgical changes are redemonstrated from CABG.  Heart size appears normal.  Lungs are adequately expanded and appear clear.  No pneumothorax or large pleural effusion is seen.        No acute cardiopulmonary abnormality is identified.       RICARDO ROWLAND MD       Electronically Signed and Approved By: RICARDO ROWLAND MD on 10/10/2023 at 6:09                Differential Diagnosis and Discussion:    Cough: Differential diagnosis includes but is not limited to pneumonia, acute bronchitis, upper respiratory infection, ACE inhibitor use, allergic reaction, epiglottitis, seasonal allergies, chemical irritants, exercise-induced asthma, viral syndrome.  Dyspnea: Differential diagnosis includes but is not limited to metabolic acidosis, neurological disorders, psychogenic, asthma, pneumothorax, upper airway obstruction, COPD, pneumonia, noncardiogenic pulmonary edema, interstitial lung disease, anemia, congestive heart failure, and pulmonary embolism    All labs were reviewed and interpreted by me.  All X-rays impressions were independently interpreted by me.  EKG was interpreted by me.    MDM     Amount and/or Complexity of Data Reviewed  Decide to obtain previous medical records or to obtain history from someone other than the patient: yes             Patient Care Considerations:    ANTIBIOTICS: I considered prescribing antibiotics as an outpatient however the patient was found to have COVID and antibiotics were not indicated for treatment of this illness.      Consultants/Shared Management Plan:    None    Social Determinants of Health:    Patient is a nursing home/assisted living resident and has reliable access to care.      Disposition and Care Coordination:    Discharged: I considered escalation of care by admitting this patient for observation, however the patient has improved and is  suitable and  stable for discharge.    I have explained the patient's condition, diagnoses and treatment plan based on the information available to me at this time. I have answered questions and addressed any concerns. The patient has a good  understanding of the patient's diagnosis, condition, and treatment plan as can be expected at this point. The vital signs have been stable. The patient's condition is stable and appropriate for discharge from the emergency department.      The patient will pursue further outpatient evaluation with the primary care physician or other designated or consulting physician as outlined in the discharge instructions. They are agreeable to this plan of care and follow-up instructions have been explained in detail. The patient has received these instructions in written format and have expressed an understanding of the discharge instructions. The patient is aware that any significant change in condition or worsening of symptoms should prompt an immediate return to this or the closest emergency department or call to 911.    Final diagnoses:   COVID-19        ED Disposition       ED Disposition   Discharge    Condition   Stable    Comment   --               This medical record created using voice recognition software.             Lewis Cunningham DO  10/11/23 0090

## 2023-10-10 NOTE — DISCHARGE INSTRUCTIONS
Self isolate/quarantine patient from other residents.  Use oxygen as needed for O2 saturations below 90%.  Use Tylenol and or Motrin as needed for any fevers.  Push fluids.  Ensure adequate hydration.  Diet as tolerated.  Return to the ER for increasing difficulty breathing, concerns for dehydration, or any other issues that may arise.

## 2023-10-12 LAB — BACTERIA SPEC AEROBE CULT: NORMAL
